# Patient Record
Sex: FEMALE | Race: BLACK OR AFRICAN AMERICAN | Employment: OTHER | URBAN - METROPOLITAN AREA
[De-identification: names, ages, dates, MRNs, and addresses within clinical notes are randomized per-mention and may not be internally consistent; named-entity substitution may affect disease eponyms.]

---

## 2021-01-18 ENCOUNTER — HOSPITAL ENCOUNTER (OUTPATIENT)
Dept: NEUROLOGY | Age: 86
Discharge: HOME | End: 2021-01-18
Payer: MEDICARE

## 2021-01-18 PROCEDURE — 95886 MUSC TEST DONE W/N TEST COMP: CPT

## 2021-01-18 PROCEDURE — 95911 NRV CNDJ TEST 9-10 STUDIES: CPT

## 2021-01-18 NOTE — PROCEDURES
[address] [facility]  [address], [address]  Phone: [phone]  Fax: [phone]      Referring Provider: Bandar Mai*  Primary Care Physician: Whit Odom DO  Patient Name: Kathy Huber  Patient YOB: 1935  Gender: female  BMI: There is no height or weight on file to calculate BMI. There were no vitals taken for this visit. 1/18/2021    Description of clinical problem:   No chief complaint on file. Pain No   ; Numbness/tingling  Yes; Weakness  No       Brief physical exam:   Sensory deficit No; Weakness No; Atrophy  No; Reflex abnormality No    Study Limitations: None    Summary of Findings:   Nerve conduction studies:   · The following nerve conduction studies were abnormal:   · Absent right sural sensory response. · All other nerve conduction studies listed in the table above were normal in latency, amplitude and conduction velocity. Louise  22.   Electrodiagnostic Laboratory  [facility]        Full Name: Darryle Chimera Gender: Female  MRN: [id number] YOB: 1935  Location[facility]> [facility] (3)      Visit Date: 1/18/2021 11:02  Age: 82 Years 6 Months Old  Examining Physician: Dr. Anita Lakhani   Referring Physician: Dr. April Bray  Technician: Fernando James   Height: 5 feet 3 inch  Weight: 213 lbs  Notes: Paresthesia of the feet      Motor NCS      Nerve / Sites Lat. Amplitude Distance Lat Diff Velocity Temp. Amp. 1-2    ms mV cm ms m/s °C %   R Peroneal - EDB      Ankle 4.38 7.3 8   31 100      Pop fossa 12.34 5.8 34 7.97 43 31.5 79.3   L Peroneal - EDB      Ankle 3.80 4.9 8   31.6 100      Pop fossa 11.77 4.5 34 7.97 43 31.7 90.9   R Tibial - AH      Ankle 4.17 4.9 8   31.9 100      Pop fossa 13.33 2.5 39 9.17 43 31.9 51.4       Sensory NCS      Nerve / Sites Onset Lat Peak Lat PP Amp Distance Velocity Temp.    ms ms µV cm m/s °C   R Superficial peroneal - Ankle      Lat leg 2.19 3. 07 8.4 10 46 31.7   L Superficial peroneal - Ankle      Lat leg 2.55 3.33 4.0 10 39 31.6   R Sural - Ankle (Calf)      Calf NR NR NR 14 NR 31.9   L Sural - Ankle (Calf)      Calf 3.70 5.05 4.5 14 38 31.6       F  Wave      Nerve F Lat M Lat F-M Lat    ms ms ms   R Peroneal - EDB 49.4 4.3 45.1   R Tibial - AH 55.4 7.1 48.2   L Peroneal - EDB 49.3 3.8 45.5       H Reflex      Nerve Lat Hmax    ms   R Tibial - Soleus 30.7   L Tibial - Soleus 32.5       EMG         EMG Summary Table     Spontaneous MUAP Recruitment   Muscle IA Fib PSW Fasc H.F. Amp Dur. PPP Pattern   R. Extensor digitorum brevis N None None None None N N N N   R. Tibialis anterior N None None None None N N N N   R. Gastrocnemius (Lateral head) N None None None None N N N N   R. Gastrocnemius (Medial head) N None None None None N N N N   R. Vastus lateralis N None None None None N N N N                       Needle EMG:   · Needle EMG was performed using a monopolar needle. · The following abnormalities were seen on needle EMG: None  All other muscles tested, as listed in the table above demonstrated normal amplitude, duration, phases and recruitment and no active denervation signs were seen. Diagnostic Interpretation:   Essentially normal EMG study for age. 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 Clinical correlation advised. Technologist: Lawernce Harada  Physician: Kana Farnsworth MD    Nerve conduction studies and electromyography were performed according to our laboratory policies and procedures which can be provided upon request. All abnormal values are identified in the table.  Laboratory normal values can also be provided upon request.       Cc: Althea, [address], [place] [address]  Electrodiagnostic Laboratory  [address] [facility], [address] [place] [address]  Phone: [phone]  Fax: [phone]